# Patient Record
Sex: FEMALE | Race: WHITE | NOT HISPANIC OR LATINO | Employment: OTHER | URBAN - METROPOLITAN AREA
[De-identification: names, ages, dates, MRNs, and addresses within clinical notes are randomized per-mention and may not be internally consistent; named-entity substitution may affect disease eponyms.]

---

## 2023-09-07 ENCOUNTER — HOSPITAL ENCOUNTER (OUTPATIENT)
Dept: DATA CONVERSION | Facility: HOSPITAL | Age: 68
Discharge: HOME | End: 2023-09-07

## 2023-09-07 DIAGNOSIS — I10 ESSENTIAL (PRIMARY) HYPERTENSION: ICD-10-CM

## 2023-09-07 DIAGNOSIS — E78.00 PURE HYPERCHOLESTEROLEMIA, UNSPECIFIED: ICD-10-CM

## 2023-09-07 LAB
ANION GAP SERPL CALCULATED.3IONS-SCNC: 9 MMOL/L (ref 0–19)
APPEARANCE PLAS: CLEAR
BUN SERPL-MCNC: 14 MG/DL (ref 8–25)
BUN/CREAT SERPL: 17.5 RATIO (ref 8–21)
CALCIUM SERPL-MCNC: 9.4 MG/DL (ref 8.5–10.4)
CHLORIDE SERPL-SCNC: 106 MMOL/L (ref 97–107)
CHOLEST SERPL-MCNC: 180 MG/DL (ref 133–200)
CHOLEST/HDLC SERPL: 2.6 RATIO
CK SERPL-CCNC: 91 U/L (ref 24–195)
CO2 SERPL-SCNC: 25 MMOL/L (ref 24–31)
COLOR SPUN FLD: YELLOW
CREAT SERPL-MCNC: 0.8 MG/DL (ref 0.4–1.6)
CREAT UR-MCNC: 143.7 MG/DL
FASTING STATUS PATIENT QL REPORTED: YES
GFR SERPL CREATININE-BSD FRML MDRD: 81 ML/MIN/1.73 M2
GLUCOSE SERPL-MCNC: 93 MG/DL (ref 65–99)
HDLC SERPL-MCNC: 70 MG/DL
LDLC SERPL CALC-MCNC: 86 MG/DL (ref 65–130)
MICROALBUMIN UR-MCNC: 12 MG/L (ref 0–23)
MICROALBUMIN/CREAT UR: 8.4 MG/G (ref 0–30)
POTASSIUM SERPL-SCNC: 4.1 MMOL/L (ref 3.4–5.1)
SODIUM SERPL-SCNC: 140 MMOL/L (ref 133–145)
TRIGL SERPL-MCNC: 120 MG/DL (ref 40–150)

## 2023-09-19 ENCOUNTER — HOSPITAL ENCOUNTER (OUTPATIENT)
Dept: DATA CONVERSION | Facility: HOSPITAL | Age: 68
Discharge: HOME | End: 2023-09-19

## 2023-09-19 DIAGNOSIS — Z12.31 ENCOUNTER FOR SCREENING MAMMOGRAM FOR MALIGNANT NEOPLASM OF BREAST: ICD-10-CM

## 2024-05-08 ENCOUNTER — APPOINTMENT (OUTPATIENT)
Dept: VASCULAR SURGERY | Facility: CLINIC | Age: 69
End: 2024-05-08

## 2024-06-25 ENCOUNTER — LAB (OUTPATIENT)
Dept: LAB | Facility: LAB | Age: 69
End: 2024-06-25
Payer: COMMERCIAL

## 2024-06-25 DIAGNOSIS — E78.00 PURE HYPERCHOLESTEROLEMIA, UNSPECIFIED: Primary | ICD-10-CM

## 2024-06-25 LAB
ALBUMIN SERPL-MCNC: 4 G/DL (ref 3.5–5)
ALP BLD-CCNC: 94 U/L (ref 35–125)
ALT SERPL-CCNC: 26 U/L (ref 5–40)
ANION GAP SERPL CALC-SCNC: 10 MMOL/L
AST SERPL-CCNC: 20 U/L (ref 5–40)
BASOPHILS # BLD MANUAL: 0 X10*3/UL (ref 0–0.1)
BASOPHILS NFR BLD MANUAL: 0 %
BILIRUB SERPL-MCNC: 1 MG/DL (ref 0.1–1.2)
BUN SERPL-MCNC: 16 MG/DL (ref 8–25)
CALCIUM SERPL-MCNC: 9.6 MG/DL (ref 8.5–10.4)
CHLORIDE SERPL-SCNC: 101 MMOL/L (ref 97–107)
CHOLEST SERPL-MCNC: 211 MG/DL (ref 133–200)
CHOLEST/HDLC SERPL: 4.4 {RATIO}
CO2 SERPL-SCNC: 27 MMOL/L (ref 24–31)
CREAT SERPL-MCNC: 0.7 MG/DL (ref 0.4–1.6)
CREAT UR-MCNC: 27.5 MG/DL
EGFRCR SERPLBLD CKD-EPI 2021: >90 ML/MIN/1.73M*2
EOSINOPHIL # BLD MANUAL: 0.12 X10*3/UL (ref 0–0.7)
EOSINOPHIL NFR BLD MANUAL: 3 %
ERYTHROCYTE [DISTWIDTH] IN BLOOD BY AUTOMATED COUNT: 12.4 % (ref 11.5–14.5)
ERYTHROCYTE [SEDIMENTATION RATE] IN BLOOD BY WESTERGREN METHOD: 61 MM/H (ref 0–30)
GLUCOSE SERPL-MCNC: 95 MG/DL (ref 65–99)
HCT VFR BLD AUTO: 39.2 % (ref 36–46)
HDLC SERPL-MCNC: 48 MG/DL
HGB BLD-MCNC: 12.7 G/DL (ref 12–16)
IMM GRANULOCYTES # BLD AUTO: 0 X10*3/UL (ref 0–0.7)
IMM GRANULOCYTES NFR BLD AUTO: 0 % (ref 0–0.9)
LDLC SERPL CALC-MCNC: 137 MG/DL (ref 65–130)
LYMPHOCYTES # BLD MANUAL: 1.56 X10*3/UL (ref 1.2–4.8)
LYMPHOCYTES NFR BLD MANUAL: 40 %
MCH RBC QN AUTO: 28.9 PG (ref 26–34)
MCHC RBC AUTO-ENTMCNC: 32.4 G/DL (ref 32–36)
MCV RBC AUTO: 89 FL (ref 80–100)
MICROALBUMIN UR-MCNC: <12 MG/L (ref 0–23)
MICROALBUMIN/CREAT UR: NORMAL MG/G{CREAT}
MONOCYTES # BLD MANUAL: 0.08 X10*3/UL (ref 0.1–1)
MONOCYTES NFR BLD MANUAL: 2 %
NEUTS SEG # BLD MANUAL: 2.15 X10*3/UL (ref 1.2–7)
NEUTS SEG NFR BLD MANUAL: 55 %
NRBC BLD-RTO: 0 /100 WBCS (ref 0–0)
PLATELET # BLD AUTO: 256 X10*3/UL (ref 150–450)
POTASSIUM SERPL-SCNC: 4.4 MMOL/L (ref 3.4–5.1)
PROT SERPL-MCNC: 7.5 G/DL (ref 5.9–7.9)
RBC # BLD AUTO: 4.39 X10*6/UL (ref 4–5.2)
RBC MORPH BLD: ABNORMAL
SODIUM SERPL-SCNC: 138 MMOL/L (ref 133–145)
TOTAL CELLS COUNTED BLD: 100
TRIGL SERPL-MCNC: 128 MG/DL (ref 40–150)
WBC # BLD AUTO: 3.9 X10*3/UL (ref 4.4–11.3)

## 2024-06-25 PROCEDURE — 82043 UR ALBUMIN QUANTITATIVE: CPT

## 2024-06-25 PROCEDURE — 85007 BL SMEAR W/DIFF WBC COUNT: CPT

## 2024-06-25 PROCEDURE — 80061 LIPID PANEL: CPT

## 2024-06-25 PROCEDURE — 85027 COMPLETE CBC AUTOMATED: CPT

## 2024-06-25 PROCEDURE — 85652 RBC SED RATE AUTOMATED: CPT

## 2024-06-25 PROCEDURE — 82570 ASSAY OF URINE CREATININE: CPT

## 2024-06-25 PROCEDURE — 36415 COLL VENOUS BLD VENIPUNCTURE: CPT

## 2024-06-25 PROCEDURE — 80053 COMPREHEN METABOLIC PANEL: CPT

## 2024-07-02 ENCOUNTER — OFFICE VISIT (OUTPATIENT)
Dept: VASCULAR SURGERY | Facility: CLINIC | Age: 69
End: 2024-07-02
Payer: COMMERCIAL

## 2024-07-02 VITALS
HEIGHT: 65 IN | WEIGHT: 169.5 LBS | DIASTOLIC BLOOD PRESSURE: 82 MMHG | BODY MASS INDEX: 28.24 KG/M2 | RESPIRATION RATE: 18 BRPM | SYSTOLIC BLOOD PRESSURE: 129 MMHG | HEART RATE: 75 BPM

## 2024-07-02 DIAGNOSIS — I83.811 VARICOSE VEINS OF LEG WITH PAIN, RIGHT: Primary | ICD-10-CM

## 2024-07-02 DIAGNOSIS — I83.813 VARICOSE VEINS OF BOTH LOWER EXTREMITIES WITH PAIN: Primary | ICD-10-CM

## 2024-07-02 PROCEDURE — 1125F AMNT PAIN NOTED PAIN PRSNT: CPT | Performed by: SURGERY

## 2024-07-02 PROCEDURE — 1159F MED LIST DOCD IN RCRD: CPT | Performed by: SURGERY

## 2024-07-02 PROCEDURE — 1036F TOBACCO NON-USER: CPT | Performed by: SURGERY

## 2024-07-02 PROCEDURE — 99202 OFFICE O/P NEW SF 15 MIN: CPT | Performed by: SURGERY

## 2024-07-02 PROCEDURE — 99212 OFFICE O/P EST SF 10 MIN: CPT | Performed by: SURGERY

## 2024-07-02 RX ORDER — ALENDRONATE SODIUM 70 MG/1
70 TABLET ORAL
COMMUNITY

## 2024-07-02 RX ORDER — ROSUVASTATIN CALCIUM 5 MG/1
5 TABLET, COATED ORAL NIGHTLY
COMMUNITY
Start: 2024-05-10

## 2024-07-02 ASSESSMENT — LIFESTYLE VARIABLES
HOW OFTEN DO YOU HAVE SIX OR MORE DRINKS ON ONE OCCASION: NEVER
HOW MANY STANDARD DRINKS CONTAINING ALCOHOL DO YOU HAVE ON A TYPICAL DAY: PATIENT DOES NOT DRINK
HOW OFTEN DO YOU HAVE A DRINK CONTAINING ALCOHOL: NEVER
AUDIT-C TOTAL SCORE: 0
SKIP TO QUESTIONS 9-10: 1

## 2024-07-02 ASSESSMENT — ENCOUNTER SYMPTOMS
LOSS OF SENSATION IN FEET: 0
DEPRESSION: 0
OCCASIONAL FEELINGS OF UNSTEADINESS: 0

## 2024-07-02 ASSESSMENT — PATIENT HEALTH QUESTIONNAIRE - PHQ9
SUM OF ALL RESPONSES TO PHQ9 QUESTIONS 1 AND 2: 0
2. FEELING DOWN, DEPRESSED OR HOPELESS: NOT AT ALL
1. LITTLE INTEREST OR PLEASURE IN DOING THINGS: NOT AT ALL

## 2024-07-02 ASSESSMENT — PAIN SCALES - GENERAL: PAINLEVEL: 5

## 2024-07-02 NOTE — PROGRESS NOTES
The patient is self-referred.  She was getting worked up by Dr. Grimaldo for venous insufficiency and was found on reflux studies to have significant superficial reflux of her left greater saphenous vein and an isolated segment of popliteal venous reflux on the left side.  Neither side has DVT and the right side is competent.    She has a patch of varicose veins on her right medial leg which has been present for years but is bothering her more more.  Prickly on her airflight to her native [place] this bothers her.  She is unable to tolerate her stockings very well because of the heat and summertime.  She has worn them in the past but they did not remedy her discomfort.    Because we are not set up for minimally invasive therapies of superficial venous reflux, I will refer her to my partner at our vein center in [place].  The reflux study from Dr. Grimaldo's office will be scanned into her electronic medical records.

## 2024-07-05 DIAGNOSIS — E78.00 PURE HYPERCHOLESTEROLEMIA, UNSPECIFIED: Primary | ICD-10-CM

## 2024-10-05 ENCOUNTER — LAB (OUTPATIENT)
Dept: LAB | Facility: LAB | Age: 69
End: 2024-10-05
Payer: COMMERCIAL

## 2024-10-05 DIAGNOSIS — E78.00 PURE HYPERCHOLESTEROLEMIA, UNSPECIFIED: Primary | ICD-10-CM

## 2024-10-05 LAB
ALBUMIN SERPL BCP-MCNC: 4.2 G/DL (ref 3.4–5)
ALP SERPL-CCNC: 93 U/L (ref 33–136)
ALT SERPL W P-5'-P-CCNC: 18 U/L (ref 7–45)
ANION GAP SERPL CALCULATED.3IONS-SCNC: 11 MMOL/L (ref 10–20)
AST SERPL W P-5'-P-CCNC: 16 U/L (ref 9–39)
BASOPHILS # BLD AUTO: 0.02 X10*3/UL (ref 0–0.1)
BASOPHILS NFR BLD AUTO: 0.5 %
BILIRUB SERPL-MCNC: 1.5 MG/DL (ref 0–1.2)
BUN SERPL-MCNC: 17 MG/DL (ref 6–23)
CALCIUM SERPL-MCNC: 9.9 MG/DL (ref 8.6–10.3)
CHLORIDE SERPL-SCNC: 103 MMOL/L (ref 98–107)
CHOLEST SERPL-MCNC: 299 MG/DL (ref 0–199)
CHOLEST/HDLC SERPL: 4.2 {RATIO}
CO2 SERPL-SCNC: 28 MMOL/L (ref 21–32)
CREAT SERPL-MCNC: 0.77 MG/DL (ref 0.5–1.05)
EGFRCR SERPLBLD CKD-EPI 2021: 85 ML/MIN/1.73M*2
EOSINOPHIL # BLD AUTO: 0.08 X10*3/UL (ref 0–0.7)
EOSINOPHIL NFR BLD AUTO: 1.9 %
ERYTHROCYTE [DISTWIDTH] IN BLOOD BY AUTOMATED COUNT: 13.1 % (ref 11.5–14.5)
ERYTHROCYTE [SEDIMENTATION RATE] IN BLOOD BY WESTERGREN METHOD: 34 MM/H (ref 0–30)
GLUCOSE SERPL-MCNC: 90 MG/DL (ref 74–99)
HCT VFR BLD AUTO: 42.9 % (ref 36–46)
HDLC SERPL-MCNC: 70.6 MG/DL
HGB BLD-MCNC: 14.1 G/DL (ref 12–16)
IMM GRANULOCYTES # BLD AUTO: 0.01 X10*3/UL (ref 0–0.7)
IMM GRANULOCYTES NFR BLD AUTO: 0.2 % (ref 0–0.9)
LDLC SERPL CALC-MCNC: 206 MG/DL
LYMPHOCYTES # BLD AUTO: 1.7 X10*3/UL (ref 1.2–4.8)
LYMPHOCYTES NFR BLD AUTO: 40.4 %
MCH RBC QN AUTO: 29.4 PG (ref 26–34)
MCHC RBC AUTO-ENTMCNC: 32.9 G/DL (ref 32–36)
MCV RBC AUTO: 89 FL (ref 80–100)
MONOCYTES # BLD AUTO: 0.31 X10*3/UL (ref 0.1–1)
MONOCYTES NFR BLD AUTO: 7.4 %
NEUTROPHILS # BLD AUTO: 2.09 X10*3/UL (ref 1.2–7.7)
NEUTROPHILS NFR BLD AUTO: 49.6 %
NON HDL CHOLESTEROL: 228 MG/DL (ref 0–149)
NRBC BLD-RTO: 0 /100 WBCS (ref 0–0)
PLATELET # BLD AUTO: 235 X10*3/UL (ref 150–450)
POTASSIUM SERPL-SCNC: 4.5 MMOL/L (ref 3.5–5.3)
PROT SERPL-MCNC: 7.7 G/DL (ref 6.4–8.2)
RBC # BLD AUTO: 4.8 X10*6/UL (ref 4–5.2)
SODIUM SERPL-SCNC: 137 MMOL/L (ref 136–145)
TRIGL SERPL-MCNC: 113 MG/DL (ref 0–149)
VLDL: 23 MG/DL (ref 0–40)
WBC # BLD AUTO: 4.2 X10*3/UL (ref 4.4–11.3)

## 2024-10-05 PROCEDURE — 80053 COMPREHEN METABOLIC PANEL: CPT

## 2024-10-05 PROCEDURE — 85652 RBC SED RATE AUTOMATED: CPT

## 2024-10-05 PROCEDURE — 85025 COMPLETE CBC W/AUTO DIFF WBC: CPT

## 2024-10-05 PROCEDURE — 80061 LIPID PANEL: CPT

## 2024-10-05 PROCEDURE — 36415 COLL VENOUS BLD VENIPUNCTURE: CPT

## 2024-12-12 ENCOUNTER — OFFICE VISIT (OUTPATIENT)
Dept: VASCULAR SURGERY | Facility: CLINIC | Age: 69
End: 2024-12-12
Payer: COMMERCIAL

## 2024-12-12 VITALS
SYSTOLIC BLOOD PRESSURE: 124 MMHG | DIASTOLIC BLOOD PRESSURE: 74 MMHG | BODY MASS INDEX: 28.12 KG/M2 | WEIGHT: 168.8 LBS | HEIGHT: 65 IN | HEART RATE: 69 BPM | OXYGEN SATURATION: 97 %

## 2024-12-12 DIAGNOSIS — I83.812 VARICOSE VEINS OF LOWER EXTREMITY WITH PAIN, LEFT: Primary | ICD-10-CM

## 2024-12-12 DIAGNOSIS — I83.813 VARICOSE VEINS OF BOTH LOWER EXTREMITIES WITH PAIN: ICD-10-CM

## 2024-12-12 PROBLEM — I10 HYPERTENSION: Status: ACTIVE | Noted: 2024-12-12

## 2024-12-12 PROBLEM — M85.80 OSTEOPENIA: Status: ACTIVE | Noted: 2024-12-12

## 2024-12-12 PROBLEM — I51.7 CARDIOMEGALY: Status: ACTIVE | Noted: 2024-12-12

## 2024-12-12 PROBLEM — I71.21 ANEURYSM OF THE ASCENDING AORTA, WITHOUT RUPTURE (CMS-HCC): Status: ACTIVE | Noted: 2023-05-24

## 2024-12-12 PROBLEM — R53.83 TIRED: Status: ACTIVE | Noted: 2024-12-12

## 2024-12-12 PROBLEM — R20.2 PARESTHESIA OF HAND: Status: ACTIVE | Noted: 2024-12-12

## 2024-12-12 PROBLEM — E87.6 HYPOKALEMIA: Status: ACTIVE | Noted: 2024-12-12

## 2024-12-12 PROBLEM — R55 SYNCOPAL EPISODES: Status: ACTIVE | Noted: 2024-12-12

## 2024-12-12 PROBLEM — E78.5 HYPERLIPIDEMIA: Status: ACTIVE | Noted: 2024-12-12

## 2024-12-12 PROBLEM — S89.90XA INJURY OF LOWER EXTREMITY: Status: ACTIVE | Noted: 2024-12-12

## 2024-12-12 PROBLEM — E55.9 VITAMIN D DEFICIENCY: Status: ACTIVE | Noted: 2024-12-12

## 2024-12-12 PROBLEM — K11.21 ACUTE PAROTITIS: Status: ACTIVE | Noted: 2024-12-12

## 2024-12-12 PROBLEM — R22.0 FACIAL SWELLING: Status: ACTIVE | Noted: 2024-12-12

## 2024-12-12 PROBLEM — H53.9 VISUAL DISTURBANCE: Status: ACTIVE | Noted: 2024-12-12

## 2024-12-12 PROBLEM — R68.89 NOT FEELING GREAT: Status: ACTIVE | Noted: 2024-12-12

## 2024-12-12 PROBLEM — R00.1 SYMPTOMATIC SINUS BRADYCARDIA: Status: ACTIVE | Noted: 2024-12-12

## 2024-12-12 PROBLEM — R55 NEAR SYNCOPE: Status: ACTIVE | Noted: 2024-12-12

## 2024-12-12 PROBLEM — R51.9 FACIAL PAIN, ACUTE: Status: ACTIVE | Noted: 2024-12-12

## 2024-12-12 PROBLEM — E80.6 HYPERBILIRUBINEMIA: Status: ACTIVE | Noted: 2024-12-12

## 2024-12-12 PROBLEM — N95.9 MENOPAUSAL AND POSTMENOPAUSAL DISORDER: Status: ACTIVE | Noted: 2024-12-12

## 2024-12-12 PROBLEM — R91.1 LUNG NODULE: Status: ACTIVE | Noted: 2024-12-12

## 2024-12-12 PROCEDURE — 3074F SYST BP LT 130 MM HG: CPT | Performed by: SURGERY

## 2024-12-12 PROCEDURE — 1036F TOBACCO NON-USER: CPT | Performed by: SURGERY

## 2024-12-12 PROCEDURE — 3008F BODY MASS INDEX DOCD: CPT | Performed by: SURGERY

## 2024-12-12 PROCEDURE — 99213 OFFICE O/P EST LOW 20 MIN: CPT | Performed by: SURGERY

## 2024-12-12 PROCEDURE — 99203 OFFICE O/P NEW LOW 30 MIN: CPT | Performed by: SURGERY

## 2024-12-12 PROCEDURE — 3078F DIAST BP <80 MM HG: CPT | Performed by: SURGERY

## 2024-12-12 PROCEDURE — 1126F AMNT PAIN NOTED NONE PRSNT: CPT | Performed by: SURGERY

## 2024-12-12 PROCEDURE — 1159F MED LIST DOCD IN RCRD: CPT | Performed by: SURGERY

## 2024-12-12 RX ORDER — ESCITALOPRAM OXALATE 10 MG/1
10 TABLET ORAL NIGHTLY
COMMUNITY
Start: 2024-12-06

## 2024-12-12 ASSESSMENT — ENCOUNTER SYMPTOMS
OCCASIONAL FEELINGS OF UNSTEADINESS: 0
LOSS OF SENSATION IN FEET: 0
DEPRESSION: 0

## 2024-12-12 ASSESSMENT — PAIN SCALES - GENERAL: PAINLEVEL_OUTOF10: 0-NO PAIN

## 2024-12-12 NOTE — PATIENT INSTRUCTIONS
It was a pleasure taking care of you today and appreciate your seeing us at our [facility] and [facility] [place] [facility].     Today's plan is as follows:  1) we will do daily compression therapy as prescribed daily - ok to remove at night  2) please follow up with Dr. Cabrera regarding the bilateral ankle pain  3) I can see you back in march with a repeat ultrasound of your veins of the left leg.       Please call the office with any questions at [phone].   You can speak to our secretaries or our clinical nurses for specific questions.   For Vein Center specific questions, you can also call [phone] or email at [email or web address]  If you need coordinating your appointments and testing you can do these at the  or by calling my office shortly after your visit.

## 2024-12-12 NOTE — PROGRESS NOTES
NPV REASON: VENOUS EVALUATION     CURRENT ENCOUNTER:  Marilu Hawley is 69 y.o. female here for VENOUS EVALUATION.  No prior venous procedures   Has been dealing with vv sx for a few years  With prolonged standing she gets ankle pain b/l - both about the same;   Every night she has pain in the ankles  No prior dvt  No prior anticoagulation.   No swelling  Has purchased compression socks     RIGHT LEG C2 Varicose Veins  RIGHT LEG PAIN 1  RIGHT LEG CEAP: C2  RIGHT LEG VCSS SCORE: 1    LEFT LEG C2 Varicose Veins  LEFT LEG PAIN 1 and VARICOSE VEINS 3  LEFT LEG CEAP: C2  LEFT LEG VCSS SCORE: 4    PastMedHX:  Past Medical History:   Diagnosis Date    Hypertension     Unspecified injury of unspecified lower leg, initial encounter     Leg injury       Meds:   Current Outpatient Medications:     alendronate (Fosamax) 70 mg tablet, Take 1 tablet (70 mg) by mouth 1 (one) time per week., Disp: , Rfl:     calcium citrate-vitamin D2 250 mg-2.5 mcg (100 unit) tablet, Take 1 tablet by mouth once daily., Disp: , Rfl:     escitalopram (Lexapro) 10 mg tablet, Take 1 tablet (10 mg) by mouth once daily at bedtime., Disp: , Rfl:     rosuvastatin (Crestor) 5 mg tablet, Take 1 tablet (5 mg) by mouth once daily at bedtime., Disp: , Rfl:     Allergies:   No Known Allergies    ROS:  Review of Systems     Objective:  Vitals:  Vitals:    12/12/24 1136   BP: 124/74   Pulse:    SpO2:         Exam:  No distress  Breathing comfortably   Not tachycardic  Abd soft, nt, nd  B/l legs with intact pulses and well perfused feet  No leg edema  Vv changes as noted in images.                     Labs:  Lab Results   Component Value Date    WBC 4.2 (L) 10/05/2024    WBC 3.9 (L) 06/25/2024    WBC 4.5 05/25/2023    HGB 14.1 10/05/2024    HGB 12.7 06/25/2024    HGB 13.2 05/25/2023    HCT 42.9 10/05/2024    HCT 39.2 06/25/2024    HCT 39.9 05/25/2023    MCV 89 10/05/2024    MCV 89 06/25/2024    MCV 90.1 05/25/2023     10/05/2024     Lab Results    Component Value Date    CREATININE 0.77 10/05/2024    CREATININE 0.70 06/25/2024    CREATININE 0.8 09/07/2023    BUN 17 10/05/2024    BUN 16 06/25/2024    BUN 14 09/07/2023     10/05/2024     06/25/2024     09/07/2023    K 4.5 10/05/2024    K 4.4 06/25/2024    K 4.1 09/07/2023     10/05/2024     06/25/2024     09/07/2023    CO2 28 10/05/2024    CO2 27 06/25/2024    CO2 25 09/07/2023         Imaging:  Reviewed OSH VI scan from 2/2024    Assessment & Plan:  Marilu Hawley is 69 y.o. female with varicose veins L>R, bilateral symmetric ankle pain,   Will need to initiate compression therapy    RIGHT LEG C2 Varicose Veins  RIGHT LEG VCSS SCORE: 1  LEFT LEG C2 Varicose Veins  LEFT LEG VCSS SCORE: 4    Her unilateral VI reflux on the left does explain her primary sx of bilateral symmetric ankle pain.  Will see back with compression sock compliance, and a left leg VI scan     1) we will do daily compression therapy as prescribed daily - ok to remove at night  2) please follow up with Dr. Cabrera regarding the bilateral ankle pain  3) I can see you back in march with a repeat ultrasound of your veins of the left leg.     Gurpreet Richards MD, MHS, RPVI  , [employer]  Director, Center for Comprehensive Venous Care, [facility] [place]  Co-Director, Vascular Laboratories, [facility] [place]  Division of Vascular Surgery and Endovascular Therapy  [facility]

## 2025-03-13 ENCOUNTER — HOSPITAL ENCOUNTER (OUTPATIENT)
Dept: VASCULAR MEDICINE | Facility: CLINIC | Age: 70
Discharge: HOME | End: 2025-03-13
Payer: COMMERCIAL

## 2025-03-13 ENCOUNTER — OFFICE VISIT (OUTPATIENT)
Dept: VASCULAR SURGERY | Facility: CLINIC | Age: 70
End: 2025-03-13
Payer: COMMERCIAL

## 2025-03-13 VITALS
BODY MASS INDEX: 28.09 KG/M2 | OXYGEN SATURATION: 98 % | SYSTOLIC BLOOD PRESSURE: 134 MMHG | DIASTOLIC BLOOD PRESSURE: 77 MMHG | HEART RATE: 61 BPM | WEIGHT: 168.6 LBS | HEIGHT: 65 IN

## 2025-03-13 DIAGNOSIS — I83.812 VARICOSE VEINS OF LOWER EXTREMITY WITH PAIN, LEFT: Primary | ICD-10-CM

## 2025-03-13 DIAGNOSIS — I83.813 VARICOSE VEINS OF BOTH LOWER EXTREMITIES WITH PAIN: ICD-10-CM

## 2025-03-13 DIAGNOSIS — I83.812 VARICOSE VEINS OF LOWER EXTREMITY WITH PAIN, LEFT: ICD-10-CM

## 2025-03-13 PROCEDURE — 99213 OFFICE O/P EST LOW 20 MIN: CPT | Performed by: SURGERY

## 2025-03-13 PROCEDURE — 93971 EXTREMITY STUDY: CPT

## 2025-03-13 PROCEDURE — 3078F DIAST BP <80 MM HG: CPT | Performed by: SURGERY

## 2025-03-13 PROCEDURE — 93971 EXTREMITY STUDY: CPT | Performed by: SURGERY

## 2025-03-13 PROCEDURE — 1159F MED LIST DOCD IN RCRD: CPT | Performed by: SURGERY

## 2025-03-13 PROCEDURE — 1126F AMNT PAIN NOTED NONE PRSNT: CPT | Performed by: SURGERY

## 2025-03-13 PROCEDURE — 3074F SYST BP LT 130 MM HG: CPT | Performed by: SURGERY

## 2025-03-13 PROCEDURE — 3008F BODY MASS INDEX DOCD: CPT | Performed by: SURGERY

## 2025-03-13 ASSESSMENT — ENCOUNTER SYMPTOMS
OCCASIONAL FEELINGS OF UNSTEADINESS: 0
LOSS OF SENSATION IN FEET: 0
DEPRESSION: 0

## 2025-03-13 ASSESSMENT — PAIN SCALES - GENERAL: PAINLEVEL_OUTOF10: 0-NO PAIN

## 2025-03-13 NOTE — PROGRESS NOTES
F/U REASON: ***    CURRENT ENCOUNTER:  Marilu Hawley is 70 y.o. female here for follow up of  ***.    Using medical grade compression stockings: ***  Previous vein procedures: ***  Previous phlebitis/DVT/PE: ***  Previous venous ulcers: ***  Family hx of varicose veins: ***      Meds:     Current Outpatient Medications:     alendronate (Fosamax) 70 mg tablet, Take 1 tablet (70 mg) by mouth 1 (one) time per week., Disp: , Rfl:     calcium citrate-vitamin D2 250 mg-2.5 mcg (100 unit) tablet, Take 1 tablet by mouth once daily., Disp: , Rfl:     escitalopram (Lexapro) 10 mg tablet, Take 1 tablet (10 mg) by mouth once daily at bedtime., Disp: , Rfl:     rosuvastatin (Crestor) 5 mg tablet, Take 1 tablet (5 mg) by mouth once daily at bedtime., Disp: , Rfl:     Allergies:   No Known Allergies    ROS:  Review of Systems  otherwise unremarkable    Objective:  Vitals:  Vitals:    03/13/25 1145   BP: 134/77   Pulse:    SpO2:         Exam:  {Peripheral vascular exam:5739}  Physical Exam      Labs:  Lab Results   Component Value Date    WBC 4.2 (L) 10/05/2024    WBC 3.9 (L) 06/25/2024    WBC 4.5 05/25/2023    HGB 14.1 10/05/2024    HGB 12.7 06/25/2024    HGB 13.2 05/25/2023    HCT 42.9 10/05/2024    HCT 39.2 06/25/2024    HCT 39.9 05/25/2023    MCV 89 10/05/2024    MCV 89 06/25/2024    MCV 90.1 05/25/2023     10/05/2024     Lab Results   Component Value Date    CREATININE 0.77 10/05/2024    CREATININE 0.70 06/25/2024    CREATININE 0.8 09/07/2023    BUN 17 10/05/2024    BUN 16 06/25/2024    BUN 14 09/07/2023     10/05/2024     06/25/2024     09/07/2023    K 4.5 10/05/2024    K 4.4 06/25/2024    K 4.1 09/07/2023     10/05/2024     06/25/2024     09/07/2023    CO2 28 10/05/2024    CO2 27 06/25/2024    CO2 25 09/07/2023         Imaging:  ***    Assessment & Plan:  Marilu Hawley is 70 y.o. female with history of *** who is presents with ***      ***    Gurpreet Richards,  MD, MHS, RPVI  , [facility] [employer]  Director, Center for Comprehensive Venous Care, [facility] [employer]  Co-Director, Vascular Laboratories, [facility] [employer]  Division of Vascular Surgery and Endovascular Therapy  [facility]

## 2025-03-13 NOTE — PATIENT INSTRUCTIONS
It was a pleasure taking care of you today and appreciate your seeing us at our [facility] and [facility] [place] [facility].     Today's plan is as follows:  1) I would recommend left leg GSV RFA and varithena at [place]  2) continue compression therapy and we will proceed with insurance approval      Please call the office with any questions at [phone].   You can speak to our secretaries or our clinical nurses for specific questions.   For Vein Center specific questions, you can also call [phone] or email at [email or web address]  If you need coordinating your appointments and testing you can do these at the  or by calling my office shortly after your visit.

## 2025-06-24 ENCOUNTER — PROCEDURE VISIT (OUTPATIENT)
Dept: VASCULAR SURGERY | Facility: CLINIC | Age: 70
End: 2025-06-24
Payer: COMMERCIAL

## 2025-06-24 VITALS
BODY MASS INDEX: 27.99 KG/M2 | SYSTOLIC BLOOD PRESSURE: 123 MMHG | HEIGHT: 65 IN | HEART RATE: 71 BPM | DIASTOLIC BLOOD PRESSURE: 77 MMHG | WEIGHT: 168 LBS

## 2025-06-24 DIAGNOSIS — I83.812 VARICOSE VEINS OF LOWER EXTREMITY WITH PAIN, LEFT: Primary | ICD-10-CM

## 2025-06-24 PROCEDURE — 36465 NJX NONCMPND SCLRSNT 1 VEIN: CPT | Performed by: SURGERY

## 2025-06-24 ASSESSMENT — ENCOUNTER SYMPTOMS
OCCASIONAL FEELINGS OF UNSTEADINESS: 0
LOSS OF SENSATION IN FEET: 0
DEPRESSION: 0

## 2025-06-24 NOTE — PROGRESS NOTES
Patient was seen today for administration of VARITHENA to left leg at the level of  medial knee and distal medial calf.     The patient was given an explanation of the protocol for administering Varithena risks and benefits were explained to the patient. After consent was obtained, the patient was positioned on the exam table in reverse trendelenberg and the left leg was prepped and draped (after tributaries and perforators were identified and marked). The veins requiring treatment were identified and measured/marked. The vein was cannulated using US guidance and venous access was confirmed. Once access was obtained, the patient was placed in steep trendelenberg for 3 minutes to drain the veins with a foam wedge was placed at foot for elevation of leg.     Aseptic technique was used along with the 's recommendation for product handling, a total of 8 cc was administered over a 2 access point with 2 administrations allowing the varithena to flow antegrade and retrograde with digit compression guidance.  While the drug was administered, we compressed the distal inflow vein and the perforators - the foam was observed as it travelled up the desired veins to be treated. During this time the ankle was also flexed to help compress the perforators.     ECHOSCLEROTHERAPY: YES  MAX VEIN SIZE TREATED: 3.9mm    Physical Exam  Musculoskeletal:        Legs:      After 2 minutes of compression, the ankle was then pumped for 30 pumps to help the foam travel cephalad. After treatment was completed, the access cannula/needle was removed and compression was held. We checked for any DVT or foam along the treated vein path and this was negative. Steri strip was applied to the access site(s) and then kerlix/ and compression pads/dressing was applied to the leg.     No reactions occurred during the administration nor while remaining in the clinic for about 10 minutes.   Wrap will stay on for 48 hours. patient will avoid  heavy exercises for 7 days and wear compression stockings on the treated leg for 2 weeks, avoid inactivity and do daily walking.   Follow up will be in 7 days with a repeat duplex in our vascular lab.    STAFF: katelin goodman knapp, gorczynski  START: 1050  STOP: 1110    Notable Findings: only the extrafascial GSV with varices were noted to have reflux on VI scan - no RFA indication for non-refluxing proximal fascial GSV which was upper 1/3 or thigh. Good coverage with varithena today        Gurpreet Goodman MD, MHS, RPVI  , [facility] [employer]  Director, Center for Comprehensive Venous Care, [facility] [place]  Co-Director, Vascular Laboratories, [facility] [place]  Division of Vascular Surgery and Endovascular Therapy  [facility]

## 2025-07-01 ENCOUNTER — ANCILLARY PROCEDURE (OUTPATIENT)
Dept: VASCULAR MEDICINE | Facility: CLINIC | Age: 70
End: 2025-07-01
Payer: COMMERCIAL

## 2025-07-01 DIAGNOSIS — I83.813 VARICOSE VEINS OF BOTH LOWER EXTREMITIES WITH PAIN: ICD-10-CM

## 2025-07-01 PROCEDURE — 93971 EXTREMITY STUDY: CPT | Performed by: SURGERY

## 2025-07-01 PROCEDURE — 93971 EXTREMITY STUDY: CPT

## 2025-07-03 ENCOUNTER — OFFICE VISIT (OUTPATIENT)
Dept: VASCULAR SURGERY | Facility: CLINIC | Age: 70
End: 2025-07-03
Payer: COMMERCIAL

## 2025-07-03 VITALS
SYSTOLIC BLOOD PRESSURE: 114 MMHG | HEIGHT: 65 IN | WEIGHT: 166 LBS | DIASTOLIC BLOOD PRESSURE: 81 MMHG | BODY MASS INDEX: 27.66 KG/M2 | HEART RATE: 60 BPM

## 2025-07-03 DIAGNOSIS — I83.812 VARICOSE VEINS OF LOWER EXTREMITY WITH PAIN, LEFT: Primary | ICD-10-CM

## 2025-07-03 PROCEDURE — 3079F DIAST BP 80-89 MM HG: CPT | Performed by: SURGERY

## 2025-07-03 PROCEDURE — 1126F AMNT PAIN NOTED NONE PRSNT: CPT | Performed by: SURGERY

## 2025-07-03 PROCEDURE — 1036F TOBACCO NON-USER: CPT | Performed by: SURGERY

## 2025-07-03 PROCEDURE — 3074F SYST BP LT 130 MM HG: CPT | Performed by: SURGERY

## 2025-07-03 PROCEDURE — 99213 OFFICE O/P EST LOW 20 MIN: CPT | Performed by: SURGERY

## 2025-07-03 PROCEDURE — 3008F BODY MASS INDEX DOCD: CPT | Performed by: SURGERY

## 2025-07-03 PROCEDURE — 1159F MED LIST DOCD IN RCRD: CPT | Performed by: SURGERY

## 2025-07-03 ASSESSMENT — ENCOUNTER SYMPTOMS
OCCASIONAL FEELINGS OF UNSTEADINESS: 0
DEPRESSION: 0
LOSS OF SENSATION IN FEET: 0

## 2025-07-03 ASSESSMENT — LIFESTYLE VARIABLES
AUDIT-C TOTAL SCORE: 0
HOW OFTEN DO YOU HAVE SIX OR MORE DRINKS ON ONE OCCASION: NEVER
SKIP TO QUESTIONS 9-10: 1
HOW MANY STANDARD DRINKS CONTAINING ALCOHOL DO YOU HAVE ON A TYPICAL DAY: PATIENT DOES NOT DRINK
HOW OFTEN DO YOU HAVE A DRINK CONTAINING ALCOHOL: NEVER

## 2025-07-03 ASSESSMENT — PAIN SCALES - GENERAL: PAINLEVEL_OUTOF10: 0-NO PAIN

## 2025-07-03 NOTE — PROGRESS NOTES
F/U REASON: s/p left leg varithena    CURRENT ENCOUNTER:  Marilu Hawley is 70 y.o. female here for follow up of s/p left leg varithena.  Leg feels better since procedure  Swimming every day  Heavyness resolved  Occ compression use but does not feel like she needs them      LEFT LEG C1 Telangiectasias or reticular veins  LEFT LEG USE OF COMPRESSION 2  LEFT LEG CEAP: C1  LEFT LEG VCSS SCORE: 2      Meds:   Current Medications[1]    Allergies:   RX Allergies[2]    ROS:  Review of Systems  otherwise unremarkable    Objective:  Vitals:  There were no vitals filed for this visit.     Exam:    No distress  Breathing comfortably   Not tachycardic  Palpable bilateral radial pulses  Abd soft, nt, nd  Bilateral legs with intact pulses   Bilateral perfused feet  No leg edema  Some sclero associated staining  ** microthrombectomy performed along mid thigh GSV and medial upper calf              Labs:  Lab Results   Component Value Date    WBC 4.2 (L) 10/05/2024    WBC 3.9 (L) 06/25/2024    WBC 4.5 05/25/2023    HGB 14.1 10/05/2024    HGB 12.7 06/25/2024    HGB 13.2 05/25/2023    HCT 42.9 10/05/2024    HCT 39.2 06/25/2024    HCT 39.9 05/25/2023    MCV 89 10/05/2024    MCV 89 06/25/2024    MCV 90.1 05/25/2023     10/05/2024     Lab Results   Component Value Date    CREATININE 0.77 10/05/2024    CREATININE 0.70 06/25/2024    CREATININE 0.8 09/07/2023    BUN 17 10/05/2024    BUN 16 06/25/2024    BUN 14 09/07/2023     10/05/2024     06/25/2024     09/07/2023    K 4.5 10/05/2024    K 4.4 06/25/2024    K 4.1 09/07/2023     10/05/2024     06/25/2024     09/07/2023    CO2 28 10/05/2024    CO2 27 06/25/2024    CO2 25 09/07/2023         Imaging:            Assessment & Plan:  Marilu Hawley is 70 y.o. female with varicose veins L>R, bilateral symmetric ankle pain,      RIGHT LEG C2 Varicose Veins  RIGHT LEG VCSS SCORE: 1  LEFT LEG C2 Varicose Veins  LEFT LEG VCSS SCORE: 4      Will see back with compression sock compliance, and a left leg VI scan   Continued sx - mostly left side - would proceed with intervention to alleviate sx.      1) I would recommend left leg GSV RFA and varithena at [facility]  2) continue compression therapy and we will proceed with insurance approval    6/24/2025: Left leg extrafascial GSV and tributary varithena.     LEFT LEG C1 Telangiectasias or reticular veins  LEFT LEG VCSS SCORE: 2    Great result   S/p microthrombectomy today  See me as needed      Gurpreet Richards MD, MHS, RPVI  , [employer] [facility] [employer]  Director, Center for Comprehensive Venous Care, [facility] [place]  Co-Director, Vascular Laboratories, [facility] [place]  Division of Vascular Surgery and Endovascular Therapy  [facility]                 [1]   Current Outpatient Medications:     alendronate (Fosamax) 70 mg tablet, Take 1 tablet (70 mg) by mouth 1 (one) time per week., Disp: , Rfl:     calcium citrate-vitamin D2 250 mg-2.5 mcg (100 unit) tablet, Take 1 tablet by mouth once daily., Disp: , Rfl:     escitalopram (Lexapro) 10 mg tablet, Take 1 tablet (10 mg) by mouth once daily at bedtime., Disp: , Rfl:     rosuvastatin (Crestor) 5 mg tablet, Take 1 tablet (5 mg) by mouth once daily at bedtime., Disp: , Rfl:   [2] No Known Allergies

## 2025-07-03 NOTE — PATIENT INSTRUCTIONS
It was a pleasure taking care of you today and appreciate your seeing us at our [facility] and Vascular [place] [facility].     Today's plan is as follows:  1) return to all routine activities  2) see me as needed      Please call the office with any questions at [phone].   You can speak to our secretaries or our clinical nurses for specific questions.   For Vein Center specific questions, you can also call [phone] or email at [email or web address]  If you need coordinating your appointments and testing you can do these at the  or by calling my office shortly after your visit.    '

## 2025-08-07 LAB
25(OH)D3+25(OH)D2 SERPL-MCNC: NORMAL NG/ML
ALBUMIN SERPL-MCNC: 4.4 G/DL (ref 3.6–5.1)
ALBUMIN/GLOB SERPL: 1.4 (CALC) (ref 1–2.5)
ALP SERPL-CCNC: 82 U/L (ref 37–153)
ALT SERPL-CCNC: 16 U/L (ref 6–29)
AST SERPL-CCNC: 17 U/L (ref 10–35)
BILIRUB SERPL-MCNC: 1.7 MG/DL (ref 0.2–1.2)
BUN SERPL-MCNC: 23 MG/DL (ref 7–25)
BUN/CREAT SERPL: ABNORMAL (CALC) (ref 6–22)
CALCIUM SERPL-MCNC: 9.8 MG/DL (ref 8.6–10.4)
CHLORIDE SERPL-SCNC: 104 MMOL/L (ref 98–110)
CHOLEST SERPL-MCNC: 203 MG/DL
CHOLEST/HDLC SERPL: 2.7 (CALC)
CO2 SERPL-SCNC: 27 MMOL/L (ref 20–32)
CREAT SERPL-MCNC: 0.72 MG/DL (ref 0.5–1.05)
EGFRCR SERPLBLD CKD-EPI 2021: 91 ML/MIN/1.73M2
ERYTHROCYTE [DISTWIDTH] IN BLOOD BY AUTOMATED COUNT: 13 % (ref 11–15)
ERYTHROCYTE [SEDIMENTATION RATE] IN BLOOD BY WESTERGREN METHOD: 17 MM/H
GLOBULIN SER CALC-MCNC: 3.1 G/DL (CALC) (ref 1.9–3.7)
GLUCOSE SERPL-MCNC: 93 MG/DL (ref 65–99)
HCT VFR BLD AUTO: 41.8 % (ref 35–45)
HDLC SERPL-MCNC: 75 MG/DL
HGB BLD-MCNC: 13.4 G/DL (ref 11.7–15.5)
LDLC SERPL CALC-MCNC: 104 MG/DL (CALC)
MCH RBC QN AUTO: 29.1 PG (ref 27–33)
MCHC RBC AUTO-ENTMCNC: 32.1 G/DL (ref 32–36)
MCV RBC AUTO: 90.7 FL (ref 80–100)
NONHDLC SERPL-MCNC: 128 MG/DL (CALC)
PLATELET # BLD AUTO: 226 THOUSAND/UL (ref 140–400)
PMV BLD REES-ECKER: 11.1 FL (ref 7.5–12.5)
POTASSIUM SERPL-SCNC: 4.5 MMOL/L (ref 3.5–5.3)
PROT SERPL-MCNC: 7.5 G/DL (ref 6.1–8.1)
QUEST TRACKING HOUSE ACCOUNT: NORMAL
RBC # BLD AUTO: 4.61 MILLION/UL (ref 3.8–5.1)
SODIUM SERPL-SCNC: 139 MMOL/L (ref 135–146)
TRIGL SERPL-MCNC: 144 MG/DL
VITAMIN D2 SERPL-MCNC: NORMAL PG/ML
VITAMIN D3 SERPL-MCNC: NORMAL NG/ML
WBC # BLD AUTO: 4.4 THOUSAND/UL (ref 3.8–10.8)

## 2025-08-12 LAB
25(OH)D3+25(OH)D2 SERPL-MCNC: 49 NG/ML (ref 30–100)
ALBUMIN SERPL-MCNC: 4.4 G/DL (ref 3.6–5.1)
ALBUMIN/GLOB SERPL: 1.4 (CALC) (ref 1–2.5)
ALP SERPL-CCNC: 82 U/L (ref 37–153)
ALT SERPL-CCNC: 16 U/L (ref 6–29)
AST SERPL-CCNC: 17 U/L (ref 10–35)
BILIRUB SERPL-MCNC: 1.7 MG/DL (ref 0.2–1.2)
BUN SERPL-MCNC: 23 MG/DL (ref 7–25)
BUN/CREAT SERPL: ABNORMAL (CALC) (ref 6–22)
CALCIUM SERPL-MCNC: 9.8 MG/DL (ref 8.6–10.4)
CHLORIDE SERPL-SCNC: 104 MMOL/L (ref 98–110)
CHOLEST SERPL-MCNC: 203 MG/DL
CHOLEST/HDLC SERPL: 2.7 (CALC)
CO2 SERPL-SCNC: 27 MMOL/L (ref 20–32)
CREAT SERPL-MCNC: 0.72 MG/DL (ref 0.5–1.05)
EGFRCR SERPLBLD CKD-EPI 2021: 91 ML/MIN/1.73M2
ERYTHROCYTE [DISTWIDTH] IN BLOOD BY AUTOMATED COUNT: 13 % (ref 11–15)
ERYTHROCYTE [SEDIMENTATION RATE] IN BLOOD BY WESTERGREN METHOD: 17 MM/H
GLOBULIN SER CALC-MCNC: 3.1 G/DL (CALC) (ref 1.9–3.7)
GLUCOSE SERPL-MCNC: 93 MG/DL (ref 65–99)
HCT VFR BLD AUTO: 41.8 % (ref 35–45)
HDLC SERPL-MCNC: 75 MG/DL
HGB BLD-MCNC: 13.4 G/DL (ref 11.7–15.5)
LDLC SERPL CALC-MCNC: 104 MG/DL (CALC)
MCH RBC QN AUTO: 29.1 PG (ref 27–33)
MCHC RBC AUTO-ENTMCNC: 32.1 G/DL (ref 32–36)
MCV RBC AUTO: 90.7 FL (ref 80–100)
NONHDLC SERPL-MCNC: 128 MG/DL (CALC)
PLATELET # BLD AUTO: 226 THOUSAND/UL (ref 140–400)
PMV BLD REES-ECKER: 11.1 FL (ref 7.5–12.5)
POTASSIUM SERPL-SCNC: 4.5 MMOL/L (ref 3.5–5.3)
PROT SERPL-MCNC: 7.5 G/DL (ref 6.1–8.1)
QUEST TRACKING HOUSE ACCOUNT: NORMAL
RBC # BLD AUTO: 4.61 MILLION/UL (ref 3.8–5.1)
SODIUM SERPL-SCNC: 139 MMOL/L (ref 135–146)
TRIGL SERPL-MCNC: 144 MG/DL
VITAMIN D2 SERPL-MCNC: <4 NG/ML
VITAMIN D3 SERPL-MCNC: 49 NG/ML
WBC # BLD AUTO: 4.4 THOUSAND/UL (ref 3.8–10.8)